# Patient Record
Sex: FEMALE | Race: WHITE | NOT HISPANIC OR LATINO | URBAN - METROPOLITAN AREA
[De-identification: names, ages, dates, MRNs, and addresses within clinical notes are randomized per-mention and may not be internally consistent; named-entity substitution may affect disease eponyms.]

---

## 2017-02-20 ENCOUNTER — IMPORTED ENCOUNTER (OUTPATIENT)
Dept: URBAN - METROPOLITAN AREA CLINIC 50 | Facility: CLINIC | Age: 62
End: 2017-02-20

## 2017-03-27 ENCOUNTER — IMPORTED ENCOUNTER (OUTPATIENT)
Dept: URBAN - METROPOLITAN AREA CLINIC 50 | Facility: CLINIC | Age: 62
End: 2017-03-27

## 2017-04-03 ENCOUNTER — IMPORTED ENCOUNTER (OUTPATIENT)
Dept: URBAN - METROPOLITAN AREA CLINIC 50 | Facility: CLINIC | Age: 62
End: 2017-04-03

## 2017-05-10 ENCOUNTER — IMPORTED ENCOUNTER (OUTPATIENT)
Dept: URBAN - METROPOLITAN AREA CLINIC 50 | Facility: CLINIC | Age: 62
End: 2017-05-10

## 2017-11-02 NOTE — PATIENT DISCUSSION
PACHYMETRY TESTING HAS BEEN ORDERED AND PERFORMED TODAY TO EVALUATE THE CORNEAL THICKNESS DUE TO ITS POTENTIAL TO INFLUENCE INTRAOCULAR PRESSURE READINGS.

## 2018-03-27 ENCOUNTER — IMPORTED ENCOUNTER (OUTPATIENT)
Dept: URBAN - METROPOLITAN AREA CLINIC 50 | Facility: CLINIC | Age: 63
End: 2018-03-27

## 2018-10-01 ENCOUNTER — IMPORTED ENCOUNTER (OUTPATIENT)
Dept: URBAN - METROPOLITAN AREA CLINIC 50 | Facility: CLINIC | Age: 63
End: 2018-10-01

## 2019-02-28 NOTE — PATIENT DISCUSSION
GLAUCOMA SUSPECT TESTING: DISCUSSED FOR PATIENT TO RETURN FOR FURTHER TESTING SUCH AS. .. HVF 24-2, OCT RNFL, AND PACHYMETRY.

## 2019-04-01 ENCOUNTER — IMPORTED ENCOUNTER (OUTPATIENT)
Dept: URBAN - METROPOLITAN AREA CLINIC 50 | Facility: CLINIC | Age: 64
End: 2019-04-01

## 2019-10-28 ENCOUNTER — IMPORTED ENCOUNTER (OUTPATIENT)
Dept: URBAN - METROPOLITAN AREA CLINIC 50 | Facility: CLINIC | Age: 64
End: 2019-10-28

## 2020-02-13 NOTE — PATIENT DISCUSSION
MODERATE DRY EYES: PRESCRIBED OTC DISAPPEARING PRESERVATIVE FREE TEARS OR PRESERVATIVE FREE ARTIFICIAL TEARS UP TO BID-QID, OU AND THE DAILY INTAKE OF OMEGA-3 DHA/EPA FATTY ACIDS TO HELP RELIEVE SYMPTOMS. ADD NIGHTLY LUBRICATING OINTMENT OR GEL. WILL CONSIDER RESTASIS OR XIIDRA OR PUNCTAL PLUGS AND/OR LIPIFLOW TREATMENT NEXT VISIT IF NOT RESPONSIVE OR IF SYMPTOMS PERSIST.  RETURN FOR FOLLOW-UP AS SCHEDULED OR SOONER IF SYMPTOMS WORSEN normal (ped)... In no apparent distress, appears well developed and well nourished.

## 2020-06-05 ENCOUNTER — IMPORTED ENCOUNTER (OUTPATIENT)
Dept: URBAN - METROPOLITAN AREA CLINIC 50 | Facility: CLINIC | Age: 65
End: 2020-06-05

## 2020-12-03 ENCOUNTER — IMPORTED ENCOUNTER (OUTPATIENT)
Dept: URBAN - METROPOLITAN AREA CLINIC 50 | Facility: CLINIC | Age: 65
End: 2020-12-03

## 2021-04-17 ASSESSMENT — VISUAL ACUITY
OD_BAT: 20/25
OD_CC: 20/40
OS_CC: J5
OS_CC: J1
OD_BAT: 20/40
OD_CC: 20/25+1
OS_BAT: 20/40
OS_CC: 20/25
OS_OTHER: 20/40. 20/70.
OS_BAT: 20/25
OS_PH: 20/25
OS_OTHER: 20/50. 20/70.
OS_CC: 20/25-+
OD_CC: 20/25
OD_CC: 20/25-
OD_SC: 20/40
OD_BAT: 20/50
OS_OTHER: 20/25. 20/25.
OD_CC: 20/30
OS_CC: 20/25
OS_BAT: 20/30
OD_CC: 20/25
OD_PH: 20/30
OS_OTHER: 20/30. 20/40-.
OS_SC: 20/40
OS_CC: 20/20-
OD_CC: J1
OS_CC: 20/40
OD_OTHER: 20/50. 20/80.
OS_CC: 20/20'PUSH'
OD_CC: J5
OD_BAT: 20/50
OS_CC: J1
OD_OTHER: 20/50. 20/70.
OD_CC: J1
OD_CC: 20/25+
OD_OTHER: 20/40. 20/70.
OD_OTHER: 20/25. 20/25.
OS_BAT: 20/50

## 2021-04-17 ASSESSMENT — TONOMETRY
OD_IOP_MMHG: 20
OS_IOP_MMHG: 20
OS_IOP_MMHG: 21
OD_IOP_MMHG: 21
OS_IOP_MMHG: 20
OD_IOP_MMHG: 18
OS_IOP_MMHG: 21
OD_IOP_MMHG: 19
OS_IOP_MMHG: 19
OD_IOP_MMHG: 22
OD_IOP_MMHG: 21
OD_IOP_MMHG: 20
OS_IOP_MMHG: 20
OD_IOP_MMHG: 22
OD_IOP_MMHG: 21
OS_IOP_MMHG: 20
OD_IOP_MMHG: 22
OD_IOP_MMHG: 22

## 2021-04-17 ASSESSMENT — PACHYMETRY
OS_CT_UM: 540
OD_CT_UM: 533
OS_CT_UM: 540
OS_CT_UM: 540
OD_CT_UM: 533
OS_CT_UM: 540
OS_CT_UM: 540
OD_CT_UM: 533
OD_CT_UM: 533
OS_CT_UM: 540
OS_CT_UM: 540
OD_CT_UM: 533

## 2021-06-15 ENCOUNTER — PREPPED CHART (OUTPATIENT)
Dept: URBAN - METROPOLITAN AREA CLINIC 53 | Facility: CLINIC | Age: 66
End: 2021-06-15

## 2021-06-17 ENCOUNTER — PREPPED CHART (OUTPATIENT)
Dept: URBAN - METROPOLITAN AREA CLINIC 53 | Facility: CLINIC | Age: 66
End: 2021-06-17

## 2021-06-17 ENCOUNTER — COMPREHENSIVE EXAM (OUTPATIENT)
Dept: URBAN - METROPOLITAN AREA CLINIC 53 | Facility: CLINIC | Age: 66
End: 2021-06-17

## 2021-06-17 DIAGNOSIS — H25.13: ICD-10-CM

## 2021-06-17 DIAGNOSIS — H35.373: ICD-10-CM

## 2021-06-17 PROCEDURE — 92134 CPTRZ OPH DX IMG PST SGM RTA: CPT

## 2021-06-17 PROCEDURE — 92014 COMPRE OPH EXAM EST PT 1/>: CPT

## 2021-06-17 ASSESSMENT — VISUAL ACUITY
OD_GLARE: 20/60
OD_GLARE: 20/50
OS_GLARE: 20/50
OS_CC: 20/20
OD_CC: 20/25+1
OS_GLARE: 20/40
OU_CC: J1+

## 2021-06-17 ASSESSMENT — TONOMETRY
OS_IOP_MMHG: 18
OD_IOP_MMHG: 19
OD_IOP_MMHG: 18

## 2022-01-13 NOTE — PATIENT DISCUSSION
Recommended artificial tears to use: 1 drop 2x a day in both eyes. Seroquel, Paxil, Xanax, Lithium, Remeron   current: Lamictal

## 2022-06-23 ENCOUNTER — COMPREHENSIVE EXAM (OUTPATIENT)
Dept: URBAN - METROPOLITAN AREA CLINIC 53 | Facility: CLINIC | Age: 67
End: 2022-06-23

## 2022-06-23 DIAGNOSIS — H35.373: ICD-10-CM

## 2022-06-23 DIAGNOSIS — H25.13: ICD-10-CM

## 2022-06-23 PROCEDURE — 92134 CPTRZ OPH DX IMG PST SGM RTA: CPT

## 2022-06-23 PROCEDURE — 92015 DETERMINE REFRACTIVE STATE: CPT

## 2022-06-23 PROCEDURE — 92014 COMPRE OPH EXAM EST PT 1/>: CPT

## 2022-06-23 ASSESSMENT — VISUAL ACUITY
OS_GLARE: 20/30
OD_PH: 20/50+1
OD_GLARE: 20/30-2
OS_SC: 20/50
OD_GLARE: 20/40
OU_CC: J2
OD_SC: 20/60
OS_GLARE: 20/30
OS_PH: 20/40

## 2022-06-23 ASSESSMENT — TONOMETRY
OD_IOP_MMHG: 20
OS_IOP_MMHG: 19

## 2023-07-13 ENCOUNTER — COMPREHENSIVE EXAM (OUTPATIENT)
Dept: URBAN - METROPOLITAN AREA CLINIC 53 | Facility: CLINIC | Age: 68
End: 2023-07-13

## 2023-07-13 DIAGNOSIS — H25.13: ICD-10-CM

## 2023-07-13 DIAGNOSIS — H52.4: ICD-10-CM

## 2023-07-13 DIAGNOSIS — H35.373: ICD-10-CM

## 2023-07-13 DIAGNOSIS — H40.053: ICD-10-CM

## 2023-07-13 PROCEDURE — 92134 CPTRZ OPH DX IMG PST SGM RTA: CPT

## 2023-07-13 PROCEDURE — 92014 COMPRE OPH EXAM EST PT 1/>: CPT

## 2023-07-13 PROCEDURE — 92015 DETERMINE REFRACTIVE STATE: CPT

## 2023-07-13 ASSESSMENT — VISUAL ACUITY
OS_GLARE: 20/30
OU_CC: J2 @ 16IN
OD_CC: 20/30-1
OU_CC: 20/25
OD_GLARE: 20/30
OS_GLARE: 20/25
OD_GLARE: 20/30
OS_CC: 20/30

## 2023-07-13 ASSESSMENT — TONOMETRY
OS_IOP_MMHG: 22
OD_IOP_MMHG: 22
OS_IOP_MMHG: 22
OD_IOP_MMHG: 23

## 2024-01-11 ENCOUNTER — FOLLOW UP (OUTPATIENT)
Dept: URBAN - METROPOLITAN AREA CLINIC 53 | Facility: CLINIC | Age: 69
End: 2024-01-11

## 2024-01-11 VITALS — HEIGHT: 55 IN | DIASTOLIC BLOOD PRESSURE: 84 MMHG | SYSTOLIC BLOOD PRESSURE: 125 MMHG | HEART RATE: 94 BPM

## 2024-01-11 DIAGNOSIS — H40.053: ICD-10-CM

## 2024-01-11 DIAGNOSIS — H25.13: ICD-10-CM

## 2024-01-11 PROCEDURE — 99213 OFFICE O/P EST LOW 20 MIN: CPT

## 2024-01-11 ASSESSMENT — TONOMETRY
OD_IOP_MMHG: 19
OS_IOP_MMHG: 18
OD_IOP_MMHG: 18
OS_IOP_MMHG: 18

## 2024-01-11 ASSESSMENT — VISUAL ACUITY
OU_CC: 20/25-1
OD_CC: 20/25-1
OS_CC: 20/25-1

## 2024-07-17 ENCOUNTER — COMPREHENSIVE EXAM (OUTPATIENT)
Dept: URBAN - METROPOLITAN AREA CLINIC 49 | Facility: LOCATION | Age: 69
End: 2024-07-17

## 2024-07-17 DIAGNOSIS — H35.373: ICD-10-CM

## 2024-07-17 DIAGNOSIS — H52.4: ICD-10-CM

## 2024-07-17 DIAGNOSIS — H25.13: ICD-10-CM

## 2024-07-17 DIAGNOSIS — H40.053: ICD-10-CM

## 2024-07-17 PROCEDURE — 92015 DETERMINE REFRACTIVE STATE: CPT

## 2024-07-17 PROCEDURE — 92134 CPTRZ OPH DX IMG PST SGM RTA: CPT

## 2024-07-17 PROCEDURE — 99214 OFFICE O/P EST MOD 30 MIN: CPT

## 2024-07-17 ASSESSMENT — VISUAL ACUITY
OD_GLARE: 20/30-1
OD_CC: 20/30-1
OS_CC: 20/25
OS_GLARE: 20/25
OS_GLARE: 20/25
OU_CC: J1+@16
OD_GLARE: 20/30-1

## 2024-07-17 ASSESSMENT — KERATOMETRY
OD_AXISANGLE_DEGREES: 22
OD_AXISANGLE2_DEGREES: 112
OD_K1POWER_DIOPTERS: 44.75
OS_AXISANGLE2_DEGREES: 66
OS_K1POWER_DIOPTERS: 44.00
OS_K2POWER_DIOPTERS: 44.25
OS_AXISANGLE_DEGREES: 156
OD_K2POWER_DIOPTERS: 45.50

## 2024-07-17 ASSESSMENT — TONOMETRY
OD_IOP_MMHG: 19
OS_IOP_MMHG: 18
OD_IOP_MMHG: 18
OS_IOP_MMHG: 18

## 2025-07-14 ENCOUNTER — COMPREHENSIVE EXAM (OUTPATIENT)
Age: 70
End: 2025-07-14

## 2025-07-14 DIAGNOSIS — H40.053: ICD-10-CM

## 2025-07-14 DIAGNOSIS — H25.13: ICD-10-CM

## 2025-07-14 DIAGNOSIS — H35.363: ICD-10-CM

## 2025-07-14 DIAGNOSIS — H52.4: ICD-10-CM

## 2025-07-14 DIAGNOSIS — H35.373: ICD-10-CM

## 2025-07-14 PROCEDURE — 92134 CPTRZ OPH DX IMG PST SGM RTA: CPT

## 2025-07-14 PROCEDURE — 99214 OFFICE O/P EST MOD 30 MIN: CPT

## 2025-07-14 PROCEDURE — 92015 DETERMINE REFRACTIVE STATE: CPT
